# Patient Record
Sex: MALE | Race: WHITE | Employment: FULL TIME | URBAN - METROPOLITAN AREA
[De-identification: names, ages, dates, MRNs, and addresses within clinical notes are randomized per-mention and may not be internally consistent; named-entity substitution may affect disease eponyms.]

---

## 2022-01-06 PROCEDURE — 99282 EMERGENCY DEPT VISIT SF MDM: CPT

## 2022-01-07 ENCOUNTER — APPOINTMENT (OUTPATIENT)
Dept: GENERAL RADIOLOGY | Facility: CLINIC | Age: 22
End: 2022-01-07
Payer: COMMERCIAL

## 2022-01-07 ENCOUNTER — HOSPITAL ENCOUNTER (EMERGENCY)
Facility: CLINIC | Age: 22
Discharge: HOME | End: 2022-01-07
Attending: EMERGENCY MEDICINE
Payer: COMMERCIAL

## 2022-01-07 VITALS
BODY MASS INDEX: 24.5 KG/M2 | HEART RATE: 76 BPM | TEMPERATURE: 98.9 F | SYSTOLIC BLOOD PRESSURE: 128 MMHG | DIASTOLIC BLOOD PRESSURE: 78 MMHG | RESPIRATION RATE: 18 BRPM | OXYGEN SATURATION: 99 % | WEIGHT: 175 LBS | HEIGHT: 71 IN

## 2022-01-07 DIAGNOSIS — S46.911A STRAIN OF RIGHT SHOULDER, INITIAL ENCOUNTER: Primary | ICD-10-CM

## 2022-01-07 PROCEDURE — 73000 X-RAY EXAM OF COLLAR BONE: CPT

## 2022-01-07 PROCEDURE — 73030 X-RAY EXAM OF SHOULDER: CPT

## 2022-01-07 PROCEDURE — 96372 THER/PROPH/DIAG INJ SC/IM: CPT

## 2022-01-07 PROCEDURE — 6360000002 HC RX W HCPCS: Performed by: EMERGENCY MEDICINE

## 2022-01-07 RX ORDER — KETOROLAC TROMETHAMINE 30 MG/ML
30 INJECTION, SOLUTION INTRAMUSCULAR; INTRAVENOUS ONCE
Status: COMPLETED | OUTPATIENT
Start: 2022-01-07 | End: 2022-01-07

## 2022-01-07 RX ADMIN — KETOROLAC TROMETHAMINE 30 MG: 30 INJECTION, SOLUTION INTRAMUSCULAR; INTRAVENOUS at 01:35

## 2022-01-07 ASSESSMENT — PAIN SCALES - GENERAL
PAINLEVEL_OUTOF10: 9
PAINLEVEL_OUTOF10: 9

## 2022-01-07 ASSESSMENT — ENCOUNTER SYMPTOMS
RESPIRATORY NEGATIVE: 1
EYES NEGATIVE: 1
ALLERGIC/IMMUNOLOGIC NEGATIVE: 1

## 2022-01-07 NOTE — ED PROVIDER NOTES
eMERGENCY dEPARTMENT eNCOUnter      Pt Name: Aretha Urbina  MRN: [id number]  [place] 2000  Date of evaluation: 1/6/2022      CHIEF COMPLAINT       Chief Complaint   Patient presents with    Shoulder Pain     right          HISTORY OF PRESENT ILLNESS    Aretha Urbina is a 22 y.o. male who presents to the emergency department with complaints of right clavicle and shoulder pain status post a snowboarding accident where he fell on his right shoulder yesterday. Patient ambulatory at this point time is no obvious deformity but he complains about of 5 out of 10 pain to the right clavicle area. REVIEW OF SYSTEMS         Review of Systems   Constitutional: Negative. HENT: Negative. Eyes: Negative. Respiratory: Negative. Cardiovascular: Negative. Endocrine: Negative. Musculoskeletal: Positive for myalgias. Skin: Negative. Allergic/Immunologic: Negative. Neurological: Negative. Hematological: Negative. Psychiatric/Behavioral: Negative. PAST MEDICAL HISTORY    has no past medical history on file. SURGICAL HISTORY      has no past surgical history on file. CURRENT MEDICATIONS       Previous Medications    No medications on file       ALLERGIES     has No Known Allergies. FAMILY HISTORY     has no family status information on file. family history is not on file. SOCIAL HISTORY          PHYSICAL EXAM     INITIAL VITALS:  height is 5' 11\" (1.803 m) and weight is 79.4 kg (175 lb). His oral temperature is 98.9 °F (37.2 °C). His blood pressure is 128/78 and his pulse is 76. His respiration is 18 and oxygen saturation is 99%.      Constitutional: Alert, oriented x3, nontoxic, afebrile, answering questions appropriately, acting properly for age, in no acute distress  HEENT: Extraocular muscles intact, mucus membranes moist, TMs clear bilaterally, no posterior pharyngeal erythema or exudates, Pupils equal, round, reactive to light,   Neck: Trachea midline, Supple without lymphadenopathy, no posterior midline neck tenderness to palpation  Cardiovascular: Regular rhythm and rate no S3, S4, or murmurs  Respiratory: Clear to auscultation bilaterally no wheezes, rhonchi, rales, no respiratory distress  Gastrointestinal: Soft, nontender, nondistended, positive bowel sounds. No rebound, rigidity, or guarding. Musculoskeletal: Examination of the right arm shoulder and clavicle does reveal good range of motion of the shoulder no tenderness to palpation at the humeral head however there is tenderness when palpating the distal end of the right clavicle. Neurologic: Moving all 4 extremities without difficulty there are no gross focal neurologic deficits  Skin: Warm and dry      DIFFERENTIAL DIAGNOSIS/ MDM:   Possible clavicular fracture patient will get an x-ray- will give him some Toradol and reevaluate him      DIAGNOSTIC RESULTS     EKG: All EKG's are interpreted by the Emergency Department Physician who either signs or Co-signs this chart in the absence of a cardiologist.        Not indicated unless otherwise documented above    LABS:  No results found for this visit on 01/07/22. Not indicated unless otherwise documented above    RADIOLOGY:   I reviewed the radiologist interpretations:  XR CLAVICLE RIGHT   Final Result   No fracture or dislocation. XR SHOULDER RIGHT (MIN 2 VIEWS)   Final Result   No fracture or dislocation.              Not indicated unless otherwise documented above    EMERGENCY DEPARTMENT COURSE:     The patient was given the following medications:  Orders Placed This Encounter   Medications    ketorolac (TORADOL) injection 30 mg        Vitals:    Vitals:    01/07/22 0056 01/07/22 0058   BP:  128/78   Pulse: 76    Resp: 18    Temp:  98.9 °F (37.2 °C)   TempSrc:  Oral   SpO2: 99%    Weight: 79.4 kg (175 lb)    Height: 5' 11\" (1.803 m)      -------------------------  /78   Pulse 76   Temp 98.9 °F (37.2 °C) (Oral)   Resp 18   Ht 5' 11\" (1.803 m)   Wt 79.4 kg (175 lb)   SpO2 99%   BMI 24.41 kg/m²         I have reviewed the disposition diagnosis with the patient and or their family/guardian. I have answered their questions and given discharge instructions. They voiced understanding of these instructions and did not have any further questions or complaints. CRITICAL CARE:    None    CONSULTS:    None    PROCEDURES:    None      [facility] Report if indicated             FINAL IMPRESSION      1. Strain of right shoulder, initial encounter          DISPOSITION/PLAN   DISPOSITION Decision To Discharge    I have reviewed the disposition diagnosis with the patient and or their family/guardian. I have answered their questions and given discharge instructions. They voiced understanding of these instructions and did not have any further questions or complaints. Reevaluation: Patient's x-rays are negative patient has no signs of any deformity has been given nonsteroidal anti-inflammatory medicine and he can be discharged home to follow-up with an urgent care within a couple of days.         PATIENT REFERRED TO:      In 2 days  Family doctor or urgent care in 2 days      DISCHARGE MEDICATIONS:  New Prescriptions    No medications on file       (Please note that portions of this note were completed with a voice recognition program.  Efforts were made to edit the dictations but occasionally words are mis-transcribed.)    Nini Higginbotham MD  Attending Emergency Physician            Nini Higginbotham MD  01/07/22 3622

## 2022-01-07 NOTE — ED NOTES
Pt presents to ED via private auto with c/o right shoulder pain, onset 1600 yesterday. Pt states he was snowboarding and fell onto his right shoulder. Pt afebrile, vitals stable.  Pt able to ambulate without assist.      Britta Guerin RN  01/07/22 0120